# Patient Record
(demographics unavailable — no encounter records)

---

## 2025-03-03 NOTE — DISCUSSION/SUMMARY
[FreeTextEntry1] : 1.cad- recurrent chest, atypical , still smoking - ccta  asa  change effient to brilinta 60mg bid  2.chol- continue zocor- at goals

## 2025-03-03 NOTE — REASON FOR VISIT
[CV Risk Factors and Non-Cardiac Disease] : CV risk factors and non-cardiac disease [FreeTextEntry1] :  Subjective:   - Summary: Mr. Cresencio Fowler, a 64-year-old man with a history of hypertension, high cholesterol, arterial blockages, and smoking. He indicates an ongoing chest pain symptom, which has intensified recently, including with physical activities such as shoveling snow and climbing stairs. This discomfort has been evident over the past one to two months.   - Chief Complaint (CC): Recurrent chest pain, intensified recently   - History of Present Illness (HPI): Mr. Fowler, with history of hypertension, hypercholesterolemia, and smoking, reports experiencing more intense bouts of chest pain. This discomfort has been particularly noticeable when exerting himself physically. There has been an increase in the patient's symptoms over the past two months, including chest pain at rest.   - Past Medical History: Hypertension, High Cholesterol, arterial blockages   - Past Surgical History: Cardiac catheterization performed in July 2023   - Family History: Not discussed in this conversation.   - Social History: Patient is a current smoker.   - Review of Systems: The patient reports increasing chest pain.   - Medications: Aspirin, Carvedilol (25mg), Ezetimibe (10mg), Flomax (0.5), Irbesartan (300mg), Nitroglycerin, Prasugrel, Ranolazine (500mg), Simvastatin   - Allergies: No known allergies mentioned.   Objective:   - Diagnostic Results: The patient has undergone several procedures in the past, one being a cardiac catheterization in July 2023 and stent placements.   - Vital Signs: Vital signs were not discussed during observation.   - Physical Examination (PE): Physical examination details were not discussed during the conversation.   Assessment:   - Summary: Given history and the upswing in chest pain intensity, I suspect heart-related issues.   - Problems:     - Hypertension     - High Cholesterol     - Arterial blockages     - Recurrent chest pain   - Differential Diagnosis:     - Angina     - Coronary Artery Disease     - Myocardial Infarction   Plan:   - Summary: Prescribed patient with Berlinta, replacing Prasugrel due to increased stroke risk. We'll be conducting an angiogram via a CAT scan within the next week followed by an in-person consult in two weeks to review results. He's been advised to cease using Effium for about four days and then proceed with Brilinta twice daily.   - Plan:           -  Drug prescription: Switch Prasugrel to Brilinta.           -  Diagnostics : Order a CAT scan angiogram.           -  Follow-up : Schedule appointment for patient in two weeks to review CAT scan results.

## 2025-06-01 NOTE — HISTORY OF PRESENT ILLNESS
[FreeTextEntry1] : Previously followed by Dr. RIGO Thao. Per last note:   - Summary: Mr. Cresencio Fowler, a 64-year-old man with a history of hypertension, high cholesterol, CAD, current smoker. He indicates an ongoing chest pain symptom, which has intensified recently, including with physical activities such as shoveling snow and climbing stairs. This discomfort has been evident over the past one to two months.  Admitted at [facility] for unstable angina, transferred to [facility] for DCB therapy given ISR in a vessel with >2 layers of stent. He underwent PCI with cutting balloon + DCB on 5/12/25. Returns for follow up, feels well. Denies any significant chest discomfort, palpitations, dyspnea, LE edema, PND or syncope.  CV Workup:  EKG: NSR with incomplete RBBB TTE 05/2025:   1. Left ventricular cavity is small. Left ventricular wall thickness is normal. Left ventricular systolic function is normal with an ejection fraction visually estimated at 60 to 65 %. There are no regional wall motion abnormalities seen.  2. Normal right ventricular cavity size and normal right ventricular systolic function.  3. Normal left and right atrial size.  4. No significant valvular disease.  5. No pericardial effusion seen.  Cardiac cath 5/10/25: severe ISR distal LCX with >2 layers of stent s/p CB angioplasty and DCB (3.0x20mm). Non-obstructive CAD otherwise  Lipids Tchol 230, HDL 34,  in 02/2025

## 2025-06-01 NOTE — REASON FOR VISIT
[CV Risk Factors and Non-Cardiac Disease] : CV risk factors and non-cardiac disease [FreeTextEntry1] : Follow up cardiac care

## 2025-06-01 NOTE — ASSESSMENT
[FreeTextEntry1] :   64-year-old man with a history of hypertension, high cholesterol, CAD with multiple stents, with recent admission to [facility] 05/2025 for unstable angina found to have severe ISR or previously stented OM1 s/p PTCA with CB angioplasty + DCB. Here for follow up cardiac care.  #ASCVD - CAD s/p PCI with PTCA/DCB in 05/2025 -continue ASA + prasugrel 10mg daily - adherence strongly encouraged - continue current lipid lowering tx; although LDL is not well controlled on recent labs - continue beta blocker, ranolazine, nitro PRN - will benefit from cardiac rehab - although he says he is not interested - smoking cessation strongly advised - Continued CV risk factor modification discussed.   # Hypertension -  well controlled - continue current anti-hypertensives inc ARB, coreg - home BP monitoring encouraged - continued exercise as tolerated - Low salt diet  # Hyperlipidemia -  Sub-optimally Controlled, Aim for a LDL of <70 - not able to tolerate high intensity statins in the past - continue simvastatin +zetia - will obtain auth for [employer] - Low fat low cholesterol diet emphasized - Heart healthy, Mediterranean diet discussed  - Continued exercise as tolerated  Follow up in 3 months with Dr. Thao